# Patient Record
(demographics unavailable — no encounter records)

---

## 2024-10-09 NOTE — PHYSICAL EXAM
[Alert] : alert [Normocephalic] : normocephalic [Closed Anterior [place]] : closed anterior fontanelle [Red Reflex] : red reflex bilateral [PERRL] : PERRL [Normally Placed Ears] : normally placed ears [Auricles Well Formed] : auricles well formed [Clear Tympanic membranes] : clear tympanic membranes [Light reflex present] : light reflex present [Bony landmarks visible] : bony landmarks visible [Nares Patent] : nares patent [Palate Intact] : palate intact [Uvula Midline] : uvula midline [Tooth Eruption] : tooth eruption [Supple, full passive range of motion] : supple, full passive range of motion [Symmetric Chest Rise] : symmetric chest rise [Clear to Auscultation Bilaterally] : clear to auscultation bilaterally [Regular Rate and Rhythm] : regular rate and rhythm [S1, S2 present] : S1, S2 present [+2 Femoral Pulses] : (+) 2 femoral pulses [Soft] : soft [Bowel Sounds] : normoactive bowel sounds [Normal External Genitalia] : normal external genitalia [Normal Vaginal Introitus] : normal vaginal introitus [No Abnormal Lymph Nodes Palpated] : no abnormal lymph nodes palpated [Symmetric Abduction and Rotation of Hips] : symmetric abduction and rotation of hips [Straight] : straight [Cranial Nerves Grossly Intact] : cranial nerves grossly intact [Discharge] : no discharge [Palpable Masses] : no palpable masses [Murmurs] : no murmurs [Tender] : nontender [Distended] : nondistended [Hepatomegaly] : no hepatomegaly [Splenomegaly] : no splenomegaly [Clitoromegaly] : no clitoromegaly [Allis Sign] : negative Allis sign [Rash or Lesions] : no rash/lesions

## 2024-10-09 NOTE — DISCUSSION/SUMMARY
[Normal Growth] : growth [Normal Development] : development [None] : No known medical problems [No Elimination Concerns] : elimination [No Feeding Concerns] : feeding [No Skin Concerns] : skin [Normal Sleep Pattern] : sleep [No Medications] : ~He/She~ is not on any medications [Parent/Guardian] : parent/guardian [] : The components of the vaccine(s) to be administered today are listed in the plan of care. The disease(s) for which the vaccine(s) are intended to prevent and the risks have been discussed with the caretaker.  The risks are also included in the appropriate vaccination information statements which have been provided to the patient's caregiver.  The caregiver has given consent to vaccinate. [FreeTextEntry1] : CBC and Lead   Oral Screen 20390  Clinical and protective findings and factors assessed and appropriate plan provided.  Brush teeth twice a day with a soft toothbrush. Fluoride comes in the forms of either prescription supplements, fluorinated toothpaste,  or drinks that may unknowingly contain fluoride. [facility] does not have fluoride in its water supply, therefore supplementation with fluoride is important to promote strong tooth enamel development. However, too much fluoride can cause can damage the teeth by causing permanent spots. Appropriate brushing for age includes avoiding a fight to get the teeth cleaned.  Oral hygiene includes avoidance of triggers for caries such as falling asleep with a bottle, discontinuing pacifiers by 18 months or sooner, and avoiding sticky sugar based products. Bacteria that live in the mouth of contacts can cause cavities, so if the infant's pacifier is exposed to someone else's mouth it should be thoroughly cleaned.   Continue table foods, 3 meals with 2-3 snacks per day. Milk options and healthy range of intake reviewed.  Incorporate a sippy cup, soft top.   When in car, keep child in rear-facing car seats until age 2, or until  the maximum height and weight for seat is reached.   Help to maintain consistent daily routines and sleep schedule.  Help baby to maintain consistent daily routines and sleep schedule.   Ensure home is safe since baby is increasingly mobile.  [employer] [facility] for on line research   Avoid phone and limit TV screen time. Reading to children supports development and learning  Risks of vaccines, benefits of vaccines, and risks of not vaccinating in the time frame recommended for patient and contacts reviewed. This includes all vaccines currently recommended (according to ACIP, AAP, and CDC) regardless of whether or not they are required for school or day care. They are all lifesaving, and may reduce the risk of disability, hospitalization, or death. Vaccination not given due to refusal.

## 2024-12-26 NOTE — HISTORY OF PRESENT ILLNESS
[de-identified] : vomiting [FreeTextEntry6] : There has been a couple of days of non bilious vomiting and loose BM'S without high fever irritability or lethargy.  The bowel movements are not bloody or mucousy.

## 2025-01-09 NOTE — PHYSICAL EXAM
[Alert] : alert [No Acute Distress] : no acute distress [Normocephalic] : normocephalic [Anterior [place] Closed] : anterior fontanelle closed [Red Reflex Bilateral] : red reflex bilateral [PERRL] : PERRL [Normally Placed Ears] : normally placed ears [Auricles Well Formed] : auricles well formed [Clear Tympanic membranes with present light reflex and bony landmarks] : clear tympanic membranes with present light reflex and bony landmarks [No Discharge] : no discharge [Nares Patent] : nares patent [Palate Intact] : palate intact [Uvula Midline] : uvula midline [Tooth Eruption] : tooth eruption  [Supple, full passive range of motion] : supple, full passive range of motion [No Palpable Masses] : no palpable masses [Symmetric Chest Rise] : symmetric chest rise [Clear to Auscultation Bilaterally] : clear to auscultation bilaterally [Regular Rate and Rhythm] : regular rate and rhythm [S1, S2 present] : S1, S2 present [No Murmurs] : no murmurs [+2 Femoral Pulses] : +2 femoral pulses [Soft] : soft [NonTender] : non tender [Non Distended] : non distended [Normoactive Bowel Sounds] : normoactive bowel sounds [No Hepatomegaly] : no hepatomegaly [No Splenomegaly] : no splenomegaly [David 1] : David 1 [No Clitoromegaly] : no clitoromegaly [Normal Vaginal Introitus] : normal vaginal introitus [Patent] : patent [Normally Placed] : normally placed [No Abnormal Lymph Nodes Palpated] : no abnormal lymph nodes palpated [No Clavicular Crepitus] : no clavicular crepitus [Negative James-Ortalani] : negative James-Ortalani [Symmetric Buttocks Creases] : symmetric buttocks creases [No Spinal Dimple] : no spinal dimple [NoTuft of Hair] : no tuft of hair [Cranial Nerves Grossly Intact] : cranial nerves grossly intact [No Rash or Lesions] : no rash or lesions

## 2025-01-09 NOTE — DISCUSSION/SUMMARY
[Normal Growth] : growth [Normal Development] : development [None] : No known medical problems [No Elimination Concerns] : elimination [No Feeding Concerns] : feeding [No Skin Concerns] : skin [Normal Sleep Pattern] : sleep [No Medications] : ~He/She~ is not on any medications [Parent/Guardian] : parent/guardian [] : The components of the vaccine(s) to be administered today are listed in the plan of care. The disease(s) for which the vaccine(s) are intended to prevent and the risks have been discussed with the caretaker.  The risks are also included in the appropriate vaccination information statements which have been provided to the patient's caregiver.  The caregiver has given consent to vaccinate. [FreeTextEntry1] : CBC and Lead screening update  Vision and hearing assessment   Healthy range and type of milk reviewed.  Continue table foods, 3 meals with 2-3 snacks per day. Incorporate a sippy cup, soft top.  Wipe or brush teeth twice daily without fighting the effort. Appropriate fluoride to avoid too much or too little reviewed.  When in car, keep child in rear-facing car seats until age 2, or until  the maximum height and weight for seat is reached.   Help baby to maintain consistent daily routines and sleep schedule.   Ensure home is safe since baby is increasingly mobile.  [employer] for on line research for .  Use consistent, positive discipline.  Read aloud to baby. Risks of vaccines, benefits of vaccines, and risks of not vaccinating in the time frame recommended for patient and contacts reviewed. This includes all vaccines currently recommended (according to ACIP, AAP, and CDC). These recommendations are made regardless of whether or not they are required for school or day care. They are effective at reducing the risk of disability, hospitalization, and death. Vaccination not given due to refusal.  It is important for guardians to recognize the importance of conveying alternative immunization plans to all health care providers, especially in the context of acute illnesses. This knowledge may assist health care providers to develop individual care plans in context to vaccination delay.

## 2025-04-18 NOTE — PHYSICAL EXAM

## 2025-04-18 NOTE — DISCUSSION/SUMMARY
[Normal Growth] : growth [Normal Development] : development [None] : No known medical problems [No Elimination Concerns] : elimination [No Feeding Concerns] : feeding [No Skin Concerns] : skin [Normal Sleep Pattern] : sleep [No Medications] : ~He/She~ is not on any medications [Parent/Guardian] : parent/guardian [] : The components of the vaccine(s) to be administered today are listed in the plan of care. The disease(s) for which the vaccine(s) are intended to prevent and the risks have been discussed with the caretaker.  The risks are also included in the appropriate vaccination information statements which have been provided to the patient's caregiver.  The caregiver has given consent to vaccinate. [FreeTextEntry1] : MCHAT AND SWYC Review  Healthy range and type of milk reviewed.  Continue table foods, 3 meals with 2-3 snacks per day.  [email or web address] for on line research for .   Read aloud to baby.  CBC and Lead screening update  Return in  6 mo for 2 year well child check.